# Patient Record
Sex: MALE | Race: BLACK OR AFRICAN AMERICAN | NOT HISPANIC OR LATINO | Employment: FULL TIME | URBAN - METROPOLITAN AREA
[De-identification: names, ages, dates, MRNs, and addresses within clinical notes are randomized per-mention and may not be internally consistent; named-entity substitution may affect disease eponyms.]

---

## 2022-12-15 DIAGNOSIS — N18.2 CHRONIC KIDNEY DISEASE, STAGE II (MILD): Primary | ICD-10-CM

## 2022-12-15 DIAGNOSIS — R80.9 MICROALBUMINURIA: ICD-10-CM

## 2023-01-24 RX ORDER — LOSARTAN POTASSIUM 25 MG/1
TABLET ORAL DAILY
COMMUNITY
End: 2023-02-01 | Stop reason: ALTCHOICE

## 2023-02-01 ENCOUNTER — OFFICE VISIT (OUTPATIENT)
Dept: NEPHROLOGY | Facility: CLINIC | Age: 33
End: 2023-02-01
Payer: COMMERCIAL

## 2023-02-01 VITALS
WEIGHT: 177 LBS | BODY MASS INDEX: 26.83 KG/M2 | HEIGHT: 68 IN | HEART RATE: 64 BPM | SYSTOLIC BLOOD PRESSURE: 129 MMHG | DIASTOLIC BLOOD PRESSURE: 85 MMHG | OXYGEN SATURATION: 99 %

## 2023-02-01 DIAGNOSIS — R94.4 DECREASED GFR: Primary | ICD-10-CM

## 2023-02-01 DIAGNOSIS — I10 PRIMARY HYPERTENSION: ICD-10-CM

## 2023-02-01 PROCEDURE — 3066F PR DOCUMENTATION OF TREATMENT FOR NEPHROPATHY: ICD-10-PCS | Mod: ,,, | Performed by: INTERNAL MEDICINE

## 2023-02-01 PROCEDURE — 1159F MED LIST DOCD IN RCRD: CPT | Mod: ,,, | Performed by: INTERNAL MEDICINE

## 2023-02-01 PROCEDURE — 1159F PR MEDICATION LIST DOCUMENTED IN MEDICAL RECORD: ICD-10-PCS | Mod: ,,, | Performed by: INTERNAL MEDICINE

## 2023-02-01 PROCEDURE — 4010F PR ACE/ARB THEARPY RXD/TAKEN: ICD-10-PCS | Mod: ,,, | Performed by: INTERNAL MEDICINE

## 2023-02-01 PROCEDURE — 3079F PR MOST RECENT DIASTOLIC BLOOD PRESSURE 80-89 MM HG: ICD-10-PCS | Mod: ,,, | Performed by: INTERNAL MEDICINE

## 2023-02-01 PROCEDURE — 3066F NEPHROPATHY DOC TX: CPT | Mod: ,,, | Performed by: INTERNAL MEDICINE

## 2023-02-01 PROCEDURE — 3008F BODY MASS INDEX DOCD: CPT | Mod: ,,, | Performed by: INTERNAL MEDICINE

## 2023-02-01 PROCEDURE — 3008F PR BODY MASS INDEX (BMI) DOCUMENTED: ICD-10-PCS | Mod: ,,, | Performed by: INTERNAL MEDICINE

## 2023-02-01 PROCEDURE — 3079F DIAST BP 80-89 MM HG: CPT | Mod: ,,, | Performed by: INTERNAL MEDICINE

## 2023-02-01 PROCEDURE — 3074F PR MOST RECENT SYSTOLIC BLOOD PRESSURE < 130 MM HG: ICD-10-PCS | Mod: ,,, | Performed by: INTERNAL MEDICINE

## 2023-02-01 PROCEDURE — 99203 PR OFFICE/OUTPT VISIT, NEW, LEVL III, 30-44 MIN: ICD-10-PCS | Mod: ,,, | Performed by: INTERNAL MEDICINE

## 2023-02-01 PROCEDURE — 99203 OFFICE O/P NEW LOW 30 MIN: CPT | Mod: ,,, | Performed by: INTERNAL MEDICINE

## 2023-02-01 PROCEDURE — 3074F SYST BP LT 130 MM HG: CPT | Mod: ,,, | Performed by: INTERNAL MEDICINE

## 2023-02-01 PROCEDURE — 4010F ACE/ARB THERAPY RXD/TAKEN: CPT | Mod: ,,, | Performed by: INTERNAL MEDICINE

## 2023-02-01 NOTE — PROGRESS NOTES
Pt Name:  Qing David  Pt :  1990  Pt MRN:  [id number]    Date: 2023    Reason for visit:     Follow up visit for Chronic Kidney Disease.    Serum creatinine 1.26, GFR 76, (?) CKD stage 2.    Chief Complaint:     The patient denies any complaints today.      Assessment & Plan:      Problem #1.  Decreased GFR    Assessment:     With several potential etiologies:    A laboratory effect to produce an increased serum creatinine with a resultant decreased GFR as a consequence. This seems unlikely because of the previously determined decreased GFR results.  A possible heritable muscle enzyme deficiency resulting in a higher than expected serum creatinine and consequent decreased GFR.  This seems quite unlikely in the absence of patient's symptoms to suggest a muscle problem.  A possible effect from poorly-controlled hypertension since his early 20s.  A possible tubulointerstitial kidney disease associated with a decreased GFR.  This seems a bit unlikely in this patient without other laboratory evidences for kidney tubulointerstitial disease.    Meanwhile, he does not use NSAID's, his employment is indoors and does not require physical labor and he does not have a physical fitness program (moderate to strenuous physical activity potentially increasing creatinine manufacture and release from muscle), and he consumes between 80 and 100 ounces of fluid daily.      Plan:     At this time, and as discussed with him, submitting him to even the minimal risks associated with a percutaneous kidney biopsy does not seem indicated.    As outlined in the plan for Problem #2,    Avoid the use of NSAID's and take in at least 72 ounces of fluid per day.    Return in 6 months with repeat kidney lab work done before the visit.      Problem #2.  Primary Hypertension    Assessment:     The primary aspect of the hypertension presumed on the basis statistical likelihood and with no other evidence in his history and physical  examination, in the laboratory data to suggest an alternative or secondary etiology.     Plan:     The patient is asked to obtain a personal blood pressure monitoring device and to check his blood pressure between 6:00 a.m. and 10:00 a.m. once a week, after having sat quietly in a quiet place for 5 minutes.  He is asked to record the level of blood pressure and pulse obtained, and record the results and bring the record with him to the next visit    He is asked to contact this provider if he has 3 blood pressures in a row that are greater than 135 mmHg systolic, so that antihypertensive agent treatment can be begun.    Has had discussion held regarding a 1600 mg sodium diet, using, as much as is possible, fresh meat, fish vegetables, and fresh fruit with salt added during the preparation.         HPI:    This is the first [facility] [place] visit for this 33 year old man referred, at the patient's mother's request, by Dr. Barry Mathew for further evaluation of the observation a reduced GFR on 3 occasions since 04/2022, including 68 on 04/29/2022, 69 on 05/29/2022, 65 on 08/12/2022, and 61 on 11/11/2022.    The patient does not use NSAID's at all, his employment requires no moderate or strenuous physical activity, he is not engaged in a physical fitness program, but he has been making an effort to intake  ounces of fluid (largely water) per day. He has no constitutional symptoms at all.     His past history includes that of hypertension since his college years in his early 20's. In the summer of 2022, he was begun by Dr. Naik on losartan 25 mg a day, that he has since discontinued on his own. He is not measuring his blood pressures at home.  He does not have headache, blurred vision, chest pain, shortness of breath at rest or exertion and non awakening from sleep.  He also has swelling in his ankles.     At presentation to the clinic today, he does not have azotemic symptoms. Specifically, he  "does not have absent appetite, nausea, chest pain, shortness of breath, shortness of breath waking him up from sleep at night, swelling, absent energy, or trouble thinking.       From the standpoint of the risk factors for the development of chronic kidney disease, the patient has had hypertension since his early 20's.       History:     History reviewed. No pertinent past medical history.  History reviewed. No pertinent surgical history.  History reviewed. No pertinent family history.  Social History     Substance and Sexual Activity   Alcohol Use Not Currently     Social History     Substance and Sexual Activity   Drug Use Not Currently     Social History     Substance and Sexual Activity   Sexual Activity Not on file     has no history on file for sexual activity.  Social History     Tobacco Use   Smoking Status Never   Smokeless Tobacco Never       Allergies:    Review of patient's allergies indicates:  No Known Allergies    Current Medications:    No current medications    ROS:     Constitutional:  Denies fever or chills   Eyes:  Denies change in visual acuity   HENT:  Denies nasal congestion or sore throat   Respiratory:  As in the history of the present illness.   Cardiovascular:  As in the history of the present illness.   GI:  As in the history of the present illness.    Musculoskeletal:  Denies back pain or joint pain   Integument:  Denies rash   Neurologic:  Denies headache, focal weakness or sensory changes   Endocrine:  Denies polyuria or polydipsia   Lymphatic:  Denies swollen glands   Psychiatric:  Denies depression or anxiety.      Physical Exam:     Vitals:   Vitals:    02/01/23 0855   BP: 129/85   Pulse: 64   SpO2: 99%   Weight: 80.3 kg (177 lb)   Height: 5' 8" (1.727 m)   PainSc: 0-No pain     Body mass index is 26.91 kg/m².    Constitutional:  Well developed, well nourished, and in no acute distress   Eyes:  PERRLA, conjunctiva normal   HENT:  Atraumatic, external ears normal, nose normal.  Neck: " There is no jugular venous distension or thyromegaly.   Respiratory:  No respiratory distress, normal breath sounds, no rales, no wheezing   Cardiovascular:  Normal rate,and a regular rhythm, no murmurs, no gallops, no rub, and no edema.  GI:  Normal bowel sounds.  Musculoskeletal:  No deformities.   Neurologic:  Alert & oriented x 3, CN 2-12 normal, normal motor function, and no asterixis.   Psychiatric:  Speech and behavior appropriate.      Labs/Tests:    Date:  01/27/2024    Na/K/Cl/CO2 = 142/4.5/105/29  BUN/Creat/GFR = 12/1.26/76  Ca/Phos/Alb/PTH = 10.0/3.7/4.6/27  U Prot/Creat = 0.1  Hgb = 13.6      Follow Up:     Return for follow-up in 6 months done before the visit.      This note was created utilizing the electronic medical records tools provided by the [facility] and its health care facilities. All of the best efforts undertaken to edit the product of that use shall necessarily fall short from time to time. Viewers and reviewers of the product of its use are encouraged to contact this provider for clarification when, and if, the product message is unclear.      The patient has been provided with their current level of kidney function including eGFR and creatinine.     We discussed strategies to slow the progression of kidney disease including:    Avoid nephrotoxic agents. Avoid over-the-counter and prescription NSAIDs (Ibuprofren, Motrin, Naproxyn, Aleve, Mobic, Celebrex, Toradol, Advil). All of these can worsen kidney function, elevate BP, cause fluid retention/swelling and elevate potassium.   Work to improve modifiable risk factors.                     BP goal <130/80        Keeping these in goal range will help prevent progression of cardiovascular disease and chronic kidney disease.     We discussed dietary modifications:  Low sodium diet: 1600 mg/d or less     We discussed lifestyle modifications:  Make sure you are drinking plenty of fluids at least 72 ounces daily  Exercise  at least 30 minutes 5 x per week (total 150 minutes per week), example brisk walking  Achieve and maintain a healthy weight (BMI 20-25)  Limit alcohol consumption to <2 drinks per day  Stop smoking  Make sure you stay current on important vaccines-- pneumonia vaccines (Pneumovax and Prevnar), flu vaccine, Hepatitis B (especially patients nearing renal replacement therapy and planning hemodialysis) and Covid-19 vaccine.      Recommendations:  Monitor your BP at home daily and record.  Bring readings to your next appt.  Call the office if your BP is persistently >130/80.  Seek urgent medical attention with signs and symptoms of uremia - extreme weakness, fatigue, confusion, anorexia, metallic taste in mouth, hiccoughs, cramping, itching, chest pain, swelling, or trouble sleeping.

## 2023-02-02 NOTE — PATIENT INSTRUCTIONS
The patient has been provided with their current level of kidney function including eGFR and creatinine.     We discussed strategies to slow the progression of kidney disease including:    Avoid nephrotoxic agents. Avoid over-the-counter and prescription NSAIDs (Ibuprofren, Motrin, Naproxyn, Aleve, Mobic, Celebrex, Toradol, Advil). All of these can worsen kidney function, elevate BP, cause fluid retention/swelling and elevate potassium.   Work to improve modifiable risk factors.                     BP goal <130/80        Keeping these in goal range will help prevent progression of cardiovascular disease and chronic kidney disease.  With regard to this, he is had discussed checking his blood pressure once a week after having sat for 5 minutes Class quiet place, and is asked to record the results of the blood pressure and pulse and bring the results with him to the next visit in 6 months.     We discussed dietary modifications:  Low sodium diet: 1600 mg/d or less     We discussed lifestyle modifications:  Make sure you are drinking plenty of fluids at least 72 ounces daily  Exercise at least 30 minutes 5 x per week (total 150 minutes per week), example brisk walking  Achieve and maintain a healthy weight (BMI 20-25)  Limit alcohol consumption to <2 drinks per day  Stop smoking  Make sure you stay current on important vaccines-- pneumonia vaccines (Pneumovax and Prevnar), flu vaccine, Hepatitis B (especially patients nearing renal replacement therapy and planning hemodialysis) and Covid-19 vaccine.      Recommendations:  Monitor your BP at home daily and record.  Bring readings to your next appt.  Call the office if your BP is persistently >130/80.  Seek urgent medical attention with signs and symptoms of uremia - extreme weakness, fatigue, confusion, anorexia, metallic taste in mouth, hiccoughs, cramping, itching, chest pain, swelling, or trouble sleeping.

## 2023-08-14 ENCOUNTER — OFFICE VISIT (OUTPATIENT)
Dept: NEPHROLOGY | Facility: CLINIC | Age: 33
End: 2023-08-14
Payer: COMMERCIAL

## 2023-08-14 VITALS
HEART RATE: 68 BPM | BODY MASS INDEX: 25.46 KG/M2 | SYSTOLIC BLOOD PRESSURE: 125 MMHG | WEIGHT: 168 LBS | DIASTOLIC BLOOD PRESSURE: 84 MMHG | OXYGEN SATURATION: 99 % | HEIGHT: 68 IN

## 2023-08-14 DIAGNOSIS — I10 PRIMARY HYPERTENSION: ICD-10-CM

## 2023-08-14 DIAGNOSIS — R94.4 DECREASED GFR: Primary | ICD-10-CM

## 2023-08-14 PROCEDURE — 3008F PR BODY MASS INDEX (BMI) DOCUMENTED: ICD-10-PCS | Mod: S$GLB,,, | Performed by: INTERNAL MEDICINE

## 2023-08-14 PROCEDURE — 1159F PR MEDICATION LIST DOCUMENTED IN MEDICAL RECORD: ICD-10-PCS | Mod: S$GLB,,, | Performed by: INTERNAL MEDICINE

## 2023-08-14 PROCEDURE — 3066F NEPHROPATHY DOC TX: CPT | Mod: S$GLB,,, | Performed by: INTERNAL MEDICINE

## 2023-08-14 PROCEDURE — 3079F PR MOST RECENT DIASTOLIC BLOOD PRESSURE 80-89 MM HG: ICD-10-PCS | Mod: S$GLB,,, | Performed by: INTERNAL MEDICINE

## 2023-08-14 PROCEDURE — 3074F SYST BP LT 130 MM HG: CPT | Mod: S$GLB,,, | Performed by: INTERNAL MEDICINE

## 2023-08-14 PROCEDURE — 3008F BODY MASS INDEX DOCD: CPT | Mod: S$GLB,,, | Performed by: INTERNAL MEDICINE

## 2023-08-14 PROCEDURE — 1159F MED LIST DOCD IN RCRD: CPT | Mod: S$GLB,,, | Performed by: INTERNAL MEDICINE

## 2023-08-14 PROCEDURE — 3079F DIAST BP 80-89 MM HG: CPT | Mod: S$GLB,,, | Performed by: INTERNAL MEDICINE

## 2023-08-14 PROCEDURE — 99213 PR OFFICE/OUTPT VISIT, EST, LEVL III, 20-29 MIN: ICD-10-PCS | Mod: S$GLB,,, | Performed by: INTERNAL MEDICINE

## 2023-08-14 PROCEDURE — 3066F PR DOCUMENTATION OF TREATMENT FOR NEPHROPATHY: ICD-10-PCS | Mod: S$GLB,,, | Performed by: INTERNAL MEDICINE

## 2023-08-14 PROCEDURE — 99213 OFFICE O/P EST LOW 20 MIN: CPT | Mod: S$GLB,,, | Performed by: INTERNAL MEDICINE

## 2023-08-14 PROCEDURE — 3074F PR MOST RECENT SYSTOLIC BLOOD PRESSURE < 130 MM HG: ICD-10-PCS | Mod: S$GLB,,, | Performed by: INTERNAL MEDICINE

## 2023-08-14 NOTE — PATIENT INSTRUCTIONS
The patient has been provided with his current level of kidney function and is asked to return for follow-up in 6 months with repeat kidney lab work done before the visit.

## 2023-08-14 NOTE — PROGRESS NOTES
Pt Name:  Qing David  Pt :  1990  Pt MRN:  [id number]    Date: 2023    Reason for visit:     Follow up visit for Chronic Kidney Disease.    Serum creatinine 1.26, GFR 76, (?) CKD stage 2.    Chief Complaint:     The patient denies any complaints today.      Assessment & Plan:      Problem #1.  Decreased GFR    Assessment:   Reasonably resolved.     With several potential etiologies:    A laboratory effect to produce an increased serum creatinine with a resultant decreased GFR as a consequence. This seems unlikely because of the previously determined decreased GFR results.  A possible heritable muscle enzyme deficiency resulting in a higher than expected serum creatinine and consequent decreased GFR.  This seems quite unlikely in the absence of patient's symptoms to suggest a muscle problem.  A possible effect from poorly-controlled hypertension since his early 20s.  A possible tubulointerstitial kidney disease associated with a decreased GFR.  This seems a bit unlikely in this patient without other laboratory evidences for kidney tubulointerstitial disease.    Meanwhile, he does not use NSAID's, his employment is indoors and does not require physical labor and he does not have a physical fitness program (moderate to strenuous physical activity potentially increasing creatinine manufacture and release from muscle), and he consumes between 80 and 100 ounces of fluid daily.      Plan:     At this time, and as discussed with him, submitting him to even the minimal risks associated with a percutaneous kidney biopsy does not seem indicated.    As outlined in the plan for Problem #2,    Avoid the use of NSAID's and take in at least 72 ounces of fluid per day.    Return in 6 months with repeat kidney lab work done before the visit.      Problem #2.  Primary Hypertension    Assessment:    Controlled with BP systolic routinely in the 120 range.     Plan:    Has had discussion held regarding a 1600 mg  sodium diet, using, as much as is possible, fresh meat, fish vegetables, and fresh fruit with salt added during the preparation.         HPI:    This is the second [facility] [place] visit for this 33 year old man referred, at the patient's mother's request, by Dr. Barry Mathew for further evaluation of the observation a reduced GFR on 3 occasions since 04/2022, including 68 on 04/29/2022, 69 on 05/29/2022, 65 on 08/12/2022, and 61 on 11/11/2022.    The patient does not use NSAID's at all, his employment requires no moderate or strenuous physical activity, he is not engaged in a physical fitness program, but he has been making an effort to intake  ounces of fluid (largely water) per day. He has no constitutional symptoms at all.     His past history includes that of hypertension since his college years in his early 20's. In the summer of 2022, he was begun by Dr. Naik on losartan 25 mg a day, that he has since discontinued on his own. He is not measuring his blood pressures at home.  He does not have headache, blurred vision, chest pain, shortness of breath at rest or exertion and non awakening from sleep.  He also has swelling in his ankles.     In the clinic today for follow up of the status of his kidney function, he does not have absent appetite, nausea, chest pain, shortness of breath, shortness of breath waking him up from sleep at night, swelling, absent energy, or trouble thinking.       From the standpoint of the risk factors for the development of chronic kidney disease, the patient has had hypertension since his early 20's.       History:     History reviewed. No pertinent past medical history.  History reviewed. No pertinent surgical history.  History reviewed. No pertinent family history.  Social History     Substance and Sexual Activity   Alcohol Use Not Currently    Comment: occasionally     Social History     Substance and Sexual Activity   Drug Use Not Currently     Social  "History     Substance and Sexual Activity   Sexual Activity Not on file     has no history on file for sexual activity.  Social History     Tobacco Use   Smoking Status Never   Smokeless Tobacco Never       Allergies:    Review of patient's allergies indicates:  No Known Allergies    Current Medications:    No current medications    ROS:     Constitutional:  Denies fever or chills   Eyes:  Denies change in visual acuity   HENT:  Denies nasal congestion or sore throat   Respiratory:  As in the history of the present illness.   Cardiovascular:  As in the history of the present illness.   GI:  As in the history of the present illness.    Musculoskeletal:  Denies back pain or joint pain   Integument:  Denies rash   Neurologic:  Denies headache, focal weakness or sensory changes   Endocrine:  Denies polyuria or polydipsia   Lymphatic:  Denies swollen glands   Psychiatric:  Denies depression or anxiety.      Physical Exam:     Vitals:   Vitals:    08/14/23 0955   BP: 125/84   Pulse: 68   SpO2: 99%   Weight: 76.2 kg (168 lb)   Height: 5' 8" (1.727 m)     Body mass index is 25.54 kg/m².    Constitutional:  Well developed, well nourished, and in no acute distress   Eyes:  PERRLA, conjunctiva normal   HENT:  Atraumatic, external ears normal, nose normal.  Neck: There is no jugular venous distension or thyromegaly.   Respiratory:  No respiratory distress, normal breath sounds, no rales, no wheezing   Cardiovascular:  Normal rate,and a regular rhythm, no murmurs, no gallops, no rub, and no edema.  GI:  Normal bowel sounds.  Musculoskeletal:  No deformities.   Neurologic:  Alert & oriented x 3, CN 2-12 normal, normal motor function, and no asterixis.   Psychiatric:  Speech and behavior appropriate.      Labs/Tests:    Date:  08/04/2023    Na/K/Cl/CO2 = 138/4.5/105/26  BUN/Creat/GFR = 18/1.24/77  Ca/Phos/Alb/PTH = 9.5/3.9/4.6/31  U Prot/Creat = TLTD  Hgb = 13.5      Follow Up:     Return for follow-up in 6 months done before the " visit.      This note was created utilizing the electronic medical records tools provided by the [facility] and its health care facilities. All of the best efforts undertaken to edit the product of that use shall necessarily fall short from time to time. Viewers and reviewers of the product of its use are encouraged to contact this provider for clarification when, and if, the product message is unclear.      The patient has been provided with their current level of kidney function including eGFR and creatinine.     We discussed strategies to slow the progression of kidney disease including:    Avoid nephrotoxic agents. Avoid over-the-counter and prescription NSAIDs (Ibuprofren, Motrin, Naproxyn, Aleve, Mobic, Celebrex, Toradol, Advil). All of these can worsen kidney function, elevate BP, cause fluid retention/swelling and elevate potassium.   Work to improve modifiable risk factors.                     BP goal <130/80        Keeping these in goal range will help prevent progression of cardiovascular disease and chronic kidney disease.     We discussed dietary modifications:  Low sodium diet: 1600 mg/d or less     We discussed lifestyle modifications:  Make sure you are drinking plenty of fluids at least 72 ounces daily  Exercise at least 30 minutes 5 x per week (total 150 minutes per week), example brisk walking  Achieve and maintain a healthy weight (BMI 20-25)  Limit alcohol consumption to <2 drinks per day  Stop smoking  Make sure you stay current on important vaccines-- pneumonia vaccines (Pneumovax and Prevnar), flu vaccine, Hepatitis B (especially patients nearing renal replacement therapy and planning hemodialysis) and Covid-19 vaccine.      Recommendations:  Monitor your BP at home daily and record.  Bring readings to your next appt.  Call the office if your BP is persistently >130/80.  Seek urgent medical attention with signs and symptoms of uremia - extreme weakness, fatigue,  confusion, anorexia, metallic taste in mouth, hiccoughs, cramping, itching, chest pain, swelling, or trouble sleeping.